# Patient Record
Sex: FEMALE | Race: WHITE | NOT HISPANIC OR LATINO | URBAN - METROPOLITAN AREA
[De-identification: names, ages, dates, MRNs, and addresses within clinical notes are randomized per-mention and may not be internally consistent; named-entity substitution may affect disease eponyms.]

---

## 2021-05-06 ENCOUNTER — TELEHEALTH PROVIDED OTHER THAN IN PATIENT'S HOME (OUTPATIENT)
Dept: URBAN - METROPOLITAN AREA TELEHEALTH 7 | Facility: TELEHEALTH | Age: 21
End: 2021-05-06

## 2021-05-06 VITALS — HEIGHT: 60 IN | WEIGHT: 116 LBS

## 2021-05-06 DIAGNOSIS — K59.09 OTHER CONSTIPATION: ICD-10-CM

## 2021-05-06 DIAGNOSIS — R10.13 EPIGASTRIC PAIN: ICD-10-CM

## 2021-05-06 PROCEDURE — 99245 OFF/OP CONSLTJ NEW/EST HI 55: CPT | Mod: 95 | Performed by: PHYSICIAN ASSISTANT

## 2021-05-06 NOTE — SERVICEHPINOTES
PATIENT VERIFIED BY DATE OF BIRTH AND NAME. Patient has been consented for this telecommunication visit.   JENNIFER PRAJAPATI   is a   20   year old    female who is being seen in consultation at the request of    for digestive concerns. She's had some epigastric pain for the past month. Pain is off and on, usually related to needing to have a BM. Pain is sharp, lasting for minutes or less. She went to [facility] recently and was sent for U/S which suggested fatty liver, no gallstones. She was advised to ask us about IBS. She reports alternating constipation and diarrhea. Constipation started recently (and has correlated with her upper abdominal pains) but she's had bouts of mid-abdominal pains followed by diarrhea "for as long as she can remember." Pain resolves after a BM. This happens about once a month. No blood in stools. She has recently had some mucous in stools. Currently she is using some stool softeners due to constipation and when she is able to pass stool (about every 4-5 days right now) she will pass a mixture of both hard and loose stools. She eats a lot of food from [employer] (works there) and drinks a lot of coffee. She used to take a lot of ibuprofen (1-2 every other day) for possible endometriosis. Has now switched to tylenol. Denies N/V, black stools. No other concerns today.

## 2021-05-06 NOTE — INTERFACERESULTNOTES
Awaiting fecal calpro stool test but these labs show low iron levels and low vitamin D - please start on Vitron C iron pills, 1 pill daily and Vitamin D 5000 IU/day with food. Schedule f/u visit for June.

## 2021-05-11 LAB
C-REACTIVE PROTEIN: 0.5 MG/L (ref ?–8)
CBC (INCLUDES DIFF/PLT): ABSOLUTE BAND NEUTROPHILS: NORMAL CELLS/UL
CBC (INCLUDES DIFF/PLT): ABSOLUTE BASOPHILS: 48 CELLS/UL (ref 0–200)
CBC (INCLUDES DIFF/PLT): ABSOLUTE BLASTS: NORMAL CELLS/UL
CBC (INCLUDES DIFF/PLT): ABSOLUTE EOSINOPHILS: 220 CELLS/UL (ref 15–500)
CBC (INCLUDES DIFF/PLT): ABSOLUTE LYMPHOCYTES: 1703 CELLS/UL (ref 850–3900)
CBC (INCLUDES DIFF/PLT): ABSOLUTE METAMYELOCYTES: NORMAL CELLS/UL
CBC (INCLUDES DIFF/PLT): ABSOLUTE MONOCYTES: 277 CELLS/UL (ref 200–950)
CBC (INCLUDES DIFF/PLT): ABSOLUTE MYELOCYTES: NORMAL CELLS/UL
CBC (INCLUDES DIFF/PLT): ABSOLUTE NEUTROPHILS: 2152 CELLS/UL (ref 1500–7800)
CBC (INCLUDES DIFF/PLT): ABSOLUTE NUCLEATED RBC: NORMAL CELLS/UL
CBC (INCLUDES DIFF/PLT): ABSOLUTE PROMYELOCYTES: NORMAL CELLS/UL
CBC (INCLUDES DIFF/PLT): BAND NEUTROPHILS: NORMAL %
CBC (INCLUDES DIFF/PLT): BASOPHILS: 1.1 %
CBC (INCLUDES DIFF/PLT): BLASTS: NORMAL %
CBC (INCLUDES DIFF/PLT): COMMENT(S): NORMAL
CBC (INCLUDES DIFF/PLT): EOSINOPHILS: 5 %
CBC (INCLUDES DIFF/PLT): HEMATOCRIT: 39.3 % (ref 35–45)
CBC (INCLUDES DIFF/PLT): HEMOGLOBIN: 12.1 G/DL (ref 11.7–15.5)
CBC (INCLUDES DIFF/PLT): LYMPHOCYTES: 38.7 %
CBC (INCLUDES DIFF/PLT): MCH: 24.4 PG — LOW (ref 27–33)
CBC (INCLUDES DIFF/PLT): MCHC: 30.8 G/DL — LOW (ref 32–36)
CBC (INCLUDES DIFF/PLT): MCV: 79.4 FL — LOW (ref 80–100)
CBC (INCLUDES DIFF/PLT): METAMYELOCYTES: NORMAL %
CBC (INCLUDES DIFF/PLT): MONOCYTES: 6.3 %
CBC (INCLUDES DIFF/PLT): MPV: 12.6 FL — HIGH (ref 7.5–12.5)
CBC (INCLUDES DIFF/PLT): MYELOCYTES: NORMAL %
CBC (INCLUDES DIFF/PLT): NEUTROPHILS: 48.9 %
CBC (INCLUDES DIFF/PLT): NUCLEATED RBC: NORMAL /100 WBC
CBC (INCLUDES DIFF/PLT): PLATELET COUNT: 241 THOUSAND/UL (ref 140–400)
CBC (INCLUDES DIFF/PLT): PROMYELOCYTES: NORMAL %
CBC (INCLUDES DIFF/PLT): RDW: 15.1 % — HIGH (ref 11–15)
CBC (INCLUDES DIFF/PLT): REACTIVE LYMPHOCYTES: NORMAL %
CBC (INCLUDES DIFF/PLT): RED BLOOD CELL COUNT: 4.95 MILLION/UL (ref 3.8–5.1)
CBC (INCLUDES DIFF/PLT): WHITE BLOOD CELL COUNT: 4.4 THOUSAND/UL (ref 3.8–10.8)
CELIAC DISEASE COMPREHENSIVE PANEL: IMMUNOGLOBULIN A: 177 MG/DL (ref 47–310)
CELIAC DISEASE COMPREHENSIVE PANEL: INTERPRETATION: (no result)
CELIAC DISEASE COMPREHENSIVE PANEL: TISSUE TRANSGLUTAMINASE AB, IGA: 1 U/ML
COMPREHENSIVE METABOLIC PANEL: ALBUMIN/GLOBULIN RATIO: 1.9 (CALC) (ref 1–2.5)
COMPREHENSIVE METABOLIC PANEL: ALBUMIN: 4.5 G/DL (ref 3.6–5.1)
COMPREHENSIVE METABOLIC PANEL: ALKALINE PHOSPHATASE: 39 U/L (ref 31–125)
COMPREHENSIVE METABOLIC PANEL: ALT: 7 U/L (ref 6–29)
COMPREHENSIVE METABOLIC PANEL: AST: 13 U/L (ref 10–30)
COMPREHENSIVE METABOLIC PANEL: BILIRUBIN, TOTAL: 0.6 MG/DL (ref 0.2–1.2)
COMPREHENSIVE METABOLIC PANEL: BUN/CREATININE RATIO: (no result) (CALC)
COMPREHENSIVE METABOLIC PANEL: CALCIUM: 9.7 MG/DL (ref 8.6–10.2)
COMPREHENSIVE METABOLIC PANEL: CARBON DIOXIDE: 26 MMOL/L (ref 20–32)
COMPREHENSIVE METABOLIC PANEL: CHLORIDE: 105 MMOL/L (ref 98–110)
COMPREHENSIVE METABOLIC PANEL: CREATININE: 0.56 MG/DL (ref 0.5–1.1)
COMPREHENSIVE METABOLIC PANEL: EGFR AFRICAN AMERICAN: 154 ML/MIN/1.73M2 (ref 60–?)
COMPREHENSIVE METABOLIC PANEL: EGFR NON-AFR. AMERICAN: 133 ML/MIN/1.73M2 (ref 60–?)
COMPREHENSIVE METABOLIC PANEL: GLOBULIN: 2.4 G/DL (CALC) (ref 1.9–3.7)
COMPREHENSIVE METABOLIC PANEL: GLUCOSE: 80 MG/DL (ref 65–99)
COMPREHENSIVE METABOLIC PANEL: POTASSIUM: 4.5 MMOL/L (ref 3.5–5.3)
COMPREHENSIVE METABOLIC PANEL: PROTEIN, TOTAL: 6.9 G/DL (ref 6.1–8.1)
COMPREHENSIVE METABOLIC PANEL: SODIUM: 139 MMOL/L (ref 135–146)
COMPREHENSIVE METABOLIC PANEL: UREA NITROGEN (BUN): 10 MG/DL (ref 7–25)
IRON, TIBC AND FERRITIN PANEL: % SATURATION: 13 % (CALC) — LOW (ref 16–45)
IRON, TIBC AND FERRITIN PANEL: FERRITIN: 4 NG/ML — LOW (ref 16–154)
IRON, TIBC AND FERRITIN PANEL: IRON BINDING CAPACITY: 380 MCG/DL (CALC) (ref 250–450)
IRON, TIBC AND FERRITIN PANEL: IRON, TOTAL: 48 MCG/DL (ref 40–190)
SED RATE BY MODIFIED WESTERGREN: 2 MM/H (ref ?–20)
T3, FREE: 3.3 PG/ML (ref 2.3–4.2)
T4, FREE: 1.1 NG/DL (ref 0.8–1.8)
TSH: 1.35 MIU/L
VITAMIN D,25-OH,TOTAL,IA: 26 NG/ML — LOW (ref 30–100)

## 2021-05-26 LAB — CALPROTECTIN, STOOL: 68 MCG/G

## 2021-06-03 ENCOUNTER — TELEHEALTH PROVIDED OTHER THAN IN PATIENT'S HOME (OUTPATIENT)
Dept: URBAN - METROPOLITAN AREA TELEHEALTH 7 | Facility: TELEHEALTH | Age: 21
End: 2021-06-03

## 2021-06-03 ENCOUNTER — OFFICE (OUTPATIENT)
Dept: URBAN - METROPOLITAN AREA CLINIC 79 | Facility: CLINIC | Age: 21
End: 2021-06-03

## 2021-06-03 VITALS — HEIGHT: 60 IN | WEIGHT: 120 LBS

## 2021-06-03 DIAGNOSIS — R19.7 DIARRHEA, UNSPECIFIED: ICD-10-CM

## 2021-06-03 DIAGNOSIS — R10.13 EPIGASTRIC PAIN: ICD-10-CM

## 2021-06-03 DIAGNOSIS — K59.09 OTHER CONSTIPATION: ICD-10-CM

## 2021-06-03 PROCEDURE — 00002: CPT | Performed by: INTERNAL MEDICINE

## 2021-06-03 PROCEDURE — 99214 OFFICE O/P EST MOD 30 MIN: CPT | Mod: 95 | Performed by: PHYSICIAN ASSISTANT

## 2021-06-03 NOTE — SERVICEHPINOTES
PATIENT VERIFIED BY DATE OF BIRTH AND NAME. Patient has been consented for this telecommunication visit. 19 yo female presents for f/u digestive concerns. She notes that she is doing much better. She has found IBgard pills to be very helpful, and has also started on Vitron C and Vitamin D due to low iron and vitamin D levels on her labs. Her abdominal pains have resolved and her bowel habits have improved. Her weight is stable. Her labs showed normal inflammatory markers and negative celiac panel. Her fecal calpro stool test did come back in the borderline range. She has had no blood in stools. Her weight is stable. No N/V or fevers.ROS today is negative. Prior hx from visit on 5/6/21: She had some epigastric pain starting in March/April. Pain is off and on, usually related to needing to have a BM. Pain is sharp, lasting for minutes or less. She went to [facility] recently and was sent for U/S which suggested fatty liver, no gallstones. She was advised to ask us about IBS. She reports alternating constipation and diarrhea. Constipation started recently (and has correlated with her upper abdominal pains) but she's had bouts of mid-abdominal pains followed by diarrhea "for as long as she can remember." Pain resolves after a BM. This happens about once a month. No blood in stools. She has recently had some mucous in stools. Currently she is using some stool softeners due to constipation and when she is able to pass stool (about every 4-5 days right now) she will pass a mixture of both hard and loose stools. She eats a lot of food from [employer] (works there) and drinks a lot of coffee. She used to take a lot of ibuprofen (1-2 every other day) for possible endometriosis. Has now switched to tylenol. 5/17/21 fecal calpro 68BR5/7/21 Hgb 12.1, MCV 79, plt 241, ferritin 4, iron sat 13%, AST/ALT 13/7, TSH 1.35, free T3 &amp T4 wnl, CRP 0.5, ESR 2, Vit D 26, celiac test neg

## 2021-06-03 NOTE — INTERFACERESULTNOTES
Low iron levels. Can start Vitron C iron pills, 1 daily - available OTC. Colonoscopy to ileum advisable, though I don't have the stool test back yet and patient wanted to wait on that.

## 2021-08-10 ENCOUNTER — TELEHEALTH PROVIDED OTHER THAN IN PATIENT'S HOME (OUTPATIENT)
Dept: URBAN - METROPOLITAN AREA TELEHEALTH 7 | Facility: TELEHEALTH | Age: 21
End: 2021-08-10

## 2021-08-10 VITALS — HEIGHT: 60 IN | WEIGHT: 115 LBS

## 2021-08-10 DIAGNOSIS — R19.7 DIARRHEA, UNSPECIFIED: ICD-10-CM

## 2021-08-10 DIAGNOSIS — R10.84 GENERALIZED ABDOMINAL PAIN: ICD-10-CM

## 2021-08-10 DIAGNOSIS — K59.09 OTHER CONSTIPATION: ICD-10-CM

## 2021-08-10 PROCEDURE — 99214 OFFICE O/P EST MOD 30 MIN: CPT | Mod: 95 | Performed by: PHYSICIAN ASSISTANT

## 2021-08-10 NOTE — SERVICEHPINOTES
PATIENT VERIFIED BY DATE OF BIRTH AND NAME. Patient has been consented for this telecommunication visit. 19 yo female presents for f/u digestive concerns. She had been doing much better when last seen two months ago but today reports that she is not doing as well. She reports a bit more nausea and also has had some diarrhea issues again, though usually only has a BM about every 2-3 days. She has abdominal pains that feel better after a BM. She hasn't gotten her f/u labs or fecal calpro done - stool test previously was at 68, considered borderline elevation. She is taking Vitron C iron pills and will use IBgard at times. She took MegaSporeBiotic for 4-6 weeks as well, not sure if it made much difference for her. No blood in stools, no fevers. Her weight is generally stable. She has possible endometriosis as well as h/o anxiety. Has previously reported h/o bouts of mid-abdominal pains followed by diarrhea "for as long as she can remember." Started having more GI symptoms earlier this year had been consuming a lot of fast food and also was using NSAIDs frequently (stopped). Reports trying to eat a healthier diet now.ROS as above, otherwise negative. 5/17/21 fecal calpro 68BR5/7/21 Hgb 12.1, MCV 79, plt 241, ferritin 4, iron sat 13%, AST/ALT 13/7, TSH 1.35, free T3 &amp T4 wnl, CRP 0.5, ESR 2, Vit D 26, celiac test neg

## 2021-08-12 LAB
CBC W/DIFF: BASO #: 0.1 K/MM3
CBC W/DIFF: BASO %: 1 %
CBC W/DIFF: EOS #: 0.3 K/MM3
CBC W/DIFF: EOS %: 5.5 %
CBC W/DIFF: HEMATOCRIT: 42 %
CBC W/DIFF: HEMOGLOBIN: 13.3 GM/DL
CBC W/DIFF: IMMATURE GRANULOCYTES #: 0.01 K/MM3
CBC W/DIFF: IMMATURE GRANULOCYTES %: 0.2 %
CBC W/DIFF: LYMPH #: 1.8 K/MM3
CBC W/DIFF: LYMPH %: 37.1 %
CBC W/DIFF: MEAN CORPUSCULAR HEMOGLOBIN: 27.1 PG — LOW
CBC W/DIFF: MEAN CORPUSCULAR HGB CONC: 31.4 G/DL — LOW
CBC W/DIFF: MEAN CORPUSCULAR VOLUME: 86.5 FL
CBC W/DIFF: MEAN PLATELET VOLUME: 12.1 FL
CBC W/DIFF: MONO #: 0.3 K/MM3
CBC W/DIFF: MONO %: 6.1 %
CBC W/DIFF: NEUTROPHILS #: 2.5 K/MM3
CBC W/DIFF: NEUTROPHILS %: 50.1 %
CBC W/DIFF: NRBC ABSOLUTE COUNT: 0 K/MM3
CBC W/DIFF: NRBC%: 0 /100WBC
CBC W/DIFF: PLATELET COUNT: 207 K/MM3
CBC W/DIFF: RED BLOOD COUNT: 4.9 M/MM3
CBC W/DIFF: RED CELL DISTRIBUTION WIDTH #: 45.6 FL
CBC W/DIFF: RED CELL DISTRIBUTION WIDTH %: 14.4 %
CBC W/DIFF: WHITE BLOOD COUNT: 4.9 K/MM3
COMPREHENSIVE METABOLIC PROF: ALANINE AMINOTRANS (ALT/GPT): 15 IU/L
COMPREHENSIVE METABOLIC PROF: ALBUMIN: 3.7 GM/DL
COMPREHENSIVE METABOLIC PROF: ALK PHOS,TOTAL: 33 IU/L
COMPREHENSIVE METABOLIC PROF: ANION GAP: 4
COMPREHENSIVE METABOLIC PROF: AST/SGOT ASPARTATE AMINO TRANS: 12 U/L — LOW
COMPREHENSIVE METABOLIC PROF: BILIRUBIN,TOTAL: 0.4 MG/DL
COMPREHENSIVE METABOLIC PROF: BLOOD UREA NITROGEN: 7 MG/DL
COMPREHENSIVE METABOLIC PROF: CALCIUM LEVEL: 8.7 MG/DL
COMPREHENSIVE METABOLIC PROF: CARBON DIOXIDE: 29 MEQ/L
COMPREHENSIVE METABOLIC PROF: CHLORIDE LEVEL: 107 MEQ/L
COMPREHENSIVE METABOLIC PROF: CREATININE,SERUM: 0.59 MG/DL — LOW
COMPREHENSIVE METABOLIC PROF: GLOMERULAR FILTRATION RATE: > 60 ML/MIN
COMPREHENSIVE METABOLIC PROF: GLUCOSE,RANDOM: 67 MG/DL — LOW
COMPREHENSIVE METABOLIC PROF: PERFORMING LOCATION: (no result)
COMPREHENSIVE METABOLIC PROF: POTASSIUM LEVEL: 4.6 MEQ/L
COMPREHENSIVE METABOLIC PROF: SODIUM LEVEL, SERUM: 140 MEQ/L
COMPREHENSIVE METABOLIC PROF: TOTAL PROTEIN: 7.4 GM/DL
FERRITIN: 9.9 NG/ML — LOW
IRON   TIBC: CALC TOTAL IRON BINDING CAP: 392 MCG/DL
IRON   TIBC: IRON (FE): 62 MCG/DL
IRON   TIBC: SATURATION %: 15.8 % — LOW
IRON   TIBC: TRANSFERRIN: 280 MG/DL

## 2021-10-21 ENCOUNTER — OFFICE (OUTPATIENT)
Dept: URBAN - METROPOLITAN AREA CLINIC 34 | Facility: CLINIC | Age: 21
End: 2021-10-21

## 2021-10-21 ENCOUNTER — TELEHEALTH PROVIDED OTHER THAN IN PATIENT'S HOME (OUTPATIENT)
Dept: URBAN - METROPOLITAN AREA TELEHEALTH 7 | Facility: TELEHEALTH | Age: 21
End: 2021-10-21

## 2021-10-21 VITALS — HEIGHT: 60 IN | WEIGHT: 115 LBS

## 2021-10-21 DIAGNOSIS — K59.09 OTHER CONSTIPATION: ICD-10-CM

## 2021-10-21 DIAGNOSIS — E55.9 VITAMIN D DEFICIENCY, UNSPECIFIED: ICD-10-CM

## 2021-10-21 DIAGNOSIS — E61.1 IRON DEFICIENCY: ICD-10-CM

## 2021-10-21 DIAGNOSIS — R19.7 DIARRHEA, UNSPECIFIED: ICD-10-CM

## 2021-10-21 DIAGNOSIS — R10.84 GENERALIZED ABDOMINAL PAIN: ICD-10-CM

## 2021-10-21 PROCEDURE — 00038: CPT | Performed by: INTERNAL MEDICINE

## 2021-10-21 PROCEDURE — 99214 OFFICE O/P EST MOD 30 MIN: CPT | Mod: 95 | Performed by: PHYSICIAN ASSISTANT

## 2021-10-21 NOTE — SERVICEHPINOTES
PATIENT VERIFIED BY DATE OF BIRTH AND NAME. Patient has been consented for this telecommunication visit.
br
br20 yo female presents for f/u digestive concerns. She failed to do her fecal calpro test or to get scheduled for colonoscopy. Her labs showed low iron she has been taking VItron C iron pills. Has been having more pain in LLQ. No correlation with foods or BMs. Reports an ER visit at [facility] and reports a negative CT scan there. No other significant change in symptoms. Can still have occasional N/V and tends to have a BM every 2-3 days (form can be normal or loose/watery). Says she is losing a little weight though reported weight today is the same as it was in August. Has found Pepcid to be helpful for her stomach - using as needed.  br
brPrior hx: She has abdominal pains that feel better after a BM. She hasn't gotten her f/u labs or fecal calpro done - stool test previously was at 68, considered borderline elevation. She is taking Vitron C iron pills and will use IBgard at times. She took MegaSporeBiotic for 4-6 weeks as well, not sure if it made much difference for her.No blood in stools, no fevers. Her weight is generally stable.She has possible endometriosis as well as h/o anxiety. Has previously reported h/o bouts of mid-abdominal pains followed by diarrhea "for as long as she can remember." Started having more GI symptoms earlier this year had been consuming a lot of fast food and also was using NSAIDs frequently (stopped). Reports trying to eat a healthier diet now.ROS as above, otherwise negative.br
br 8/12/21 WBC 4.9, Hgb 13.3, iron sat 16, TIBC 392, ferritin 9.9, AST/ALT 12/15, alb 3.7br5/17/21 fecal calpro 68br5/7/21 Hgb 12.1, MCV 79, plt 241, ferritin 4, iron sat 13%, AST/ALT 13/7, TSH 1.35, free T3 &amp T4 wnl, CRP 0.5, ESR 2, Vit D 26, celiac test neg

## 2021-11-11 ENCOUNTER — OFFICE (OUTPATIENT)
Dept: URBAN - METROPOLITAN AREA CLINIC 34 | Facility: CLINIC | Age: 21
End: 2021-11-11
Payer: COMMERCIAL

## 2021-11-11 DIAGNOSIS — Z11.59 ENCOUNTER FOR SCREENING FOR OTHER VIRAL DISEASES: ICD-10-CM

## 2021-11-11 PROCEDURE — 99211 OFF/OP EST MAY X REQ PHY/QHP: CPT | Mod: CS,25 | Performed by: INTERNAL MEDICINE

## 2021-11-11 PROCEDURE — 99211 OFF/OP EST MAY X REQ PHY/QHP: CPT | Mod: 25,CS | Performed by: INTERNAL MEDICINE

## 2021-11-15 ENCOUNTER — OFFICE (OUTPATIENT)
Dept: URBAN - METROPOLITAN AREA CLINIC 98 | Facility: CLINIC | Age: 21
End: 2021-11-15

## 2021-11-15 VITALS
DIASTOLIC BLOOD PRESSURE: 60 MMHG | SYSTOLIC BLOOD PRESSURE: 120 MMHG | RESPIRATION RATE: 16 BRPM | DIASTOLIC BLOOD PRESSURE: 56 MMHG | HEART RATE: 84 BPM | DIASTOLIC BLOOD PRESSURE: 62 MMHG | SYSTOLIC BLOOD PRESSURE: 124 MMHG | DIASTOLIC BLOOD PRESSURE: 84 MMHG | HEART RATE: 83 BPM | OXYGEN SATURATION: 99 % | OXYGEN SATURATION: 98 % | TEMPERATURE: 97.7 F | SYSTOLIC BLOOD PRESSURE: 96 MMHG | HEART RATE: 72 BPM | RESPIRATION RATE: 24 BRPM | RESPIRATION RATE: 12 BRPM | HEIGHT: 60 IN | HEART RATE: 80 BPM | DIASTOLIC BLOOD PRESSURE: 79 MMHG | WEIGHT: 115 LBS | TEMPERATURE: 97.9 F | DIASTOLIC BLOOD PRESSURE: 80 MMHG | HEART RATE: 73 BPM | SYSTOLIC BLOOD PRESSURE: 118 MMHG | SYSTOLIC BLOOD PRESSURE: 106 MMHG | SYSTOLIC BLOOD PRESSURE: 92 MMHG

## 2021-11-15 DIAGNOSIS — R19.7 DIARRHEA, UNSPECIFIED: ICD-10-CM

## 2021-11-15 DIAGNOSIS — R10.84 GENERALIZED ABDOMINAL PAIN: ICD-10-CM

## 2021-11-15 DIAGNOSIS — K59.09 OTHER CONSTIPATION: ICD-10-CM

## 2021-12-21 ENCOUNTER — TELEHEALTH PROVIDED OTHER THAN IN PATIENT'S HOME (OUTPATIENT)
Dept: URBAN - METROPOLITAN AREA TELEHEALTH 3 | Facility: TELEHEALTH | Age: 21
End: 2021-12-21

## 2021-12-21 VITALS — HEIGHT: 60 IN | WEIGHT: 110 LBS

## 2021-12-21 DIAGNOSIS — R19.7 DIARRHEA, UNSPECIFIED: ICD-10-CM

## 2021-12-21 DIAGNOSIS — E61.1 IRON DEFICIENCY: ICD-10-CM

## 2021-12-21 DIAGNOSIS — R10.84 GENERALIZED ABDOMINAL PAIN: ICD-10-CM

## 2021-12-21 DIAGNOSIS — E55.9 VITAMIN D DEFICIENCY, UNSPECIFIED: ICD-10-CM

## 2021-12-21 DIAGNOSIS — K59.09 OTHER CONSTIPATION: ICD-10-CM

## 2021-12-21 PROCEDURE — 99214 OFFICE O/P EST MOD 30 MIN: CPT | Mod: 95 | Performed by: PHYSICIAN ASSISTANT

## 2021-12-21 RX ORDER — LINACLOTIDE 72 UG/1
CAPSULE, GELATIN COATED ORAL
Qty: 30 | Refills: 5 | Status: ACTIVE
Start: 2021-12-21

## 2021-12-21 NOTE — SERVICEHPINOTES
PATIENT VERIFIED BY DATE OF BIRTH AND NAME. Patient has been consented for this telecommunication visit.
daylin
br21 yo female presents for f/u digestive concerns. She had a colonoscopy in November with finding of hemorrhoid but it was otherwise normal, including ileum. Since her colonoscopy she's had repeat pattern of going a few days without a BM and then having a day with several large BMs. Stools usually formed. Can have some cramping prior to the first BM. 
daylin mao 
She is otherwise doing pretty well. No UGI symptoms currently. daylin mao She has high stress levels right now with planning her wedding and also got promoted at work. daylin Del Castillo prior labs showed low iron she has been taking VItron C iron pills. Also on vitamin D due to low levels earlier this year. daylin mao Reports an ER visit at [facility] (prior to her October visit with us) and reports a negative CT scan there. daylin mao She has possible endometriosis as well as h/o anxiety. Has previously reported h/o bouts of mid-abdominal pains followed by diarrhea "for as long as she can remember." Started having more GI symptoms earlier this year had been consuming a lot of fast food and also was using NSAIDs frequently (stopped). Reports trying to eat a healthier diet now.ROS as above, otherwise negative.8/12/21 WBC 4.9, Hgb 13.3, iron sat 16, TIBC 392, ferritin 9.9, AST/ALT 12/15, alb 3.7br5/17/21 fecal calpro 68br5/7/21 Hgb 12.1, MCV 79, plt 241, ferritin 4, iron sat 13%, AST/ALT 13/7, TSH 1.35, free T3 &amp T4 wnl, CRP 0.5, ESR 2, Vit D 26, celiac test neg